# Patient Record
Sex: FEMALE | URBAN - METROPOLITAN AREA
[De-identification: names, ages, dates, MRNs, and addresses within clinical notes are randomized per-mention and may not be internally consistent; named-entity substitution may affect disease eponyms.]

---

## 2017-02-10 ENCOUNTER — IMPORTED ENCOUNTER (OUTPATIENT)
Dept: URBAN - METROPOLITAN AREA CLINIC 50 | Facility: CLINIC | Age: 82
End: 2017-02-10

## 2021-04-17 ASSESSMENT — TONOMETRY
OD_IOP_MMHG: 16
OS_IOP_MMHG: 16

## 2021-04-26 NOTE — PATIENT DISCUSSION
Cataracts Glasses Given: It was explained that when the vision no longer meets the patient's needs, and when a new prescription for glasses does not improve visual satisfaction, that the option of cataract surgery is a reasonable next step. It has been determined from manifest refraction and vision testing that a new prescription for glasses may help improve the visual symptoms somewhat but it is not known if they will provide vision that is satisfactory. A new prescription for glasses was provided to the patient and we will re-evaluate in 1 month.

## 2021-04-26 NOTE — PATIENT DISCUSSION
REFRACTIVE ERROR, OU - DISCUSSED OPTION OF CORRECTING AT THE TIME OF CATARACT SURGERY. PT UNDERSTANDS AND ELECTS TO CONSIDER THEIR OPTIONS. ADELAIDE IS RECOMMENDED IN S/P LASIK PATIENTS. SHE IS NOT A CANDIDATE FOR MULTIFOCAL IOL'S 2' HX OF RETINAL TEAR.

## 2021-04-26 NOTE — PATIENT DISCUSSION
CATARACT, OU - EQUALLY VISUALLY SIGNIFICANT . PT DESIRES TO TRY GLASSES FIRST AND F/U IN 1 MONTH. AVT IF PT IS STILL SYMPTOMATIC.